# Patient Record
Sex: FEMALE | Race: WHITE | NOT HISPANIC OR LATINO | URBAN - NONMETROPOLITAN AREA
[De-identification: names, ages, dates, MRNs, and addresses within clinical notes are randomized per-mention and may not be internally consistent; named-entity substitution may affect disease eponyms.]

---

## 2017-09-07 ENCOUNTER — OFFICE VISIT (OUTPATIENT)
Dept: MEDICAL GROUP | Facility: CLINIC | Age: 62
End: 2017-09-07
Payer: MEDICAID

## 2017-09-07 VITALS
RESPIRATION RATE: 16 BRPM | BODY MASS INDEX: 33.79 KG/M2 | TEMPERATURE: 98.6 F | OXYGEN SATURATION: 94 % | HEIGHT: 61 IN | WEIGHT: 179 LBS | SYSTOLIC BLOOD PRESSURE: 128 MMHG | HEART RATE: 98 BPM | DIASTOLIC BLOOD PRESSURE: 74 MMHG

## 2017-09-07 DIAGNOSIS — E11.41 TYPE 2 DIABETES MELLITUS WITH DIABETIC MONONEUROPATHY, WITHOUT LONG-TERM CURRENT USE OF INSULIN (HCC): ICD-10-CM

## 2017-09-07 DIAGNOSIS — Z23 NEED FOR VACCINATION: ICD-10-CM

## 2017-09-07 DIAGNOSIS — E78.00 HYPERCHOLESTEREMIA: ICD-10-CM

## 2017-09-07 DIAGNOSIS — E53.8 VITAMIN B12 DEFICIENCY WITHOUT ANEMIA: ICD-10-CM

## 2017-09-07 DIAGNOSIS — M25.561 CHRONIC PAIN OF BOTH KNEES: ICD-10-CM

## 2017-09-07 DIAGNOSIS — Z76.89 ENCOUNTER TO ESTABLISH CARE: ICD-10-CM

## 2017-09-07 DIAGNOSIS — M25.562 CHRONIC PAIN OF BOTH KNEES: ICD-10-CM

## 2017-09-07 DIAGNOSIS — G89.29 CHRONIC PAIN OF BOTH KNEES: ICD-10-CM

## 2017-09-07 DIAGNOSIS — I10 ESSENTIAL HYPERTENSION: ICD-10-CM

## 2017-09-07 PROBLEM — E11.8 TYPE 2 DIABETES MELLITUS WITH COMPLICATION, WITHOUT LONG-TERM CURRENT USE OF INSULIN (HCC): Status: ACTIVE | Noted: 2017-09-07

## 2017-09-07 PROCEDURE — 90686 IIV4 VACC NO PRSV 0.5 ML IM: CPT | Performed by: PHYSICIAN ASSISTANT

## 2017-09-07 PROCEDURE — 99204 OFFICE O/P NEW MOD 45 MIN: CPT | Mod: 25 | Performed by: PHYSICIAN ASSISTANT

## 2017-09-07 PROCEDURE — 90471 IMMUNIZATION ADMIN: CPT | Performed by: PHYSICIAN ASSISTANT

## 2017-09-07 RX ORDER — GABAPENTIN 300 MG/1
300 CAPSULE ORAL DAILY
COMMUNITY
End: 2020-10-22

## 2017-09-07 RX ORDER — HYDROXYZINE 50 MG/1
50 TABLET, FILM COATED ORAL 3 TIMES DAILY PRN
COMMUNITY
End: 2021-05-17

## 2017-09-07 RX ORDER — ATORVASTATIN CALCIUM 20 MG/1
20 TABLET, FILM COATED ORAL NIGHTLY
COMMUNITY
End: 2019-10-14 | Stop reason: SDUPTHER

## 2017-09-07 RX ORDER — ALBUTEROL SULFATE 90 UG/1
2 AEROSOL, METERED RESPIRATORY (INHALATION) EVERY 6 HOURS PRN
COMMUNITY
End: 2019-10-14 | Stop reason: SDUPTHER

## 2017-09-07 ASSESSMENT — PATIENT HEALTH QUESTIONNAIRE - PHQ9: CLINICAL INTERPRETATION OF PHQ2 SCORE: 0

## 2017-09-07 NOTE — ASSESSMENT & PLAN NOTE
Patient has had mild knee pain that is worse in the mornings or after sitting for long periods and better when she stand up and moves around a little bit. She believes it is arthritis and she occasionally takes ibuprofen for it but states that she does not like to take medications just in case they stop working for her. She has no fevers or acute symptoms and some crunching/popping sounds but no trauma to either knee.

## 2017-09-07 NOTE — PROGRESS NOTES
Chief Complaint   Patient presents with   • Establish Care       HISTORY OF THE PRESENT ILLNESS: This is a 62 y.o. female new patient to our practice who presents today for evaluation and management of:    Type 2 diabetes mellitus with diabetic mononeuropathy, without long-term current use of insulin (CMS-[facility])  Last A1c: 7.2 in 2017 per patient   DM Medications: metformin and januvia Patient reports good medication compliance.   HTN: Blood pressure goal <140/<90 Yes  ACE: lisinopril  Hyperlipidemia: Cholesterol goal LDL <100 Unknown, awaiting lab results.   Currently Rx Statin: Atorvastatin   Diabetic diet: Yes, needs more education  Exercise: none currently due to left knee pain.   Last monofilament foot exam:  Checks feet at home: Yes, no sores currently   Last Eye exam: Sees Dr. Cruz in [place] last was 2016   Kidney function: Awaiting lab results  Microalbumin screening: Awaiting results.   Has patient received flu vaccine: Yes, administered today.  Has patient received Hep B series:Yes    A1c goal <7 No  Current barriers to control include education and self control with diet  Glucose monitoring frequency: daily  Fastin-150's Post-Prandial: 160's  Hypoglycemic episodes No  Diabetic complications: peripheral neuropathy    The patient is not taking ASA every day and is taking all other medications as prescribed. Patient denies any side effects of medication.      Chronic pain of both knees  Patient has had mild knee pain that is worse in the mornings or after sitting for long periods and better when she stand up and moves around a little bit. She believes it is arthritis and she occasionally takes ibuprofen for it but states that she does not like to take medications just in case they stop working for her. She has no fevers or acute symptoms and some crunching/popping sounds but no trauma to either knee.     Encounter to establish care  Patient would like to voice grievance with last practitioner  not pushing her hard enough to change her diet or exercise regimen but only pushing increases in medication regardless of diet changes. She also would like to voice that she will walk out of an office if she waits for longer than 40 minutes to be seen.     Essential hypertension  Well controlled on lisinopril 10mg tab at this time. Denies chest pain, shortness of breath, blurred vision, headache or dizziness.     Hypercholesteremia  Patient believes she had labs for this in July and was told she was fine but she is unsure of actual numbers. She takes her atorvastatin 20mg daily although she may occasionally miss a dose. She has no current signs of arteriosclerosis complication.      Vitamin B12 deficiency without anemia  Patient states she had been deemed extremely low in vitaminn B12 but witthout anemia. She had been injecting B12 but recently ran out of this medication.       Past Medical History:   Diagnosis Date   • Asthma     uses Symbicort    • Dental disorder     upper and lower dentures   • Diabetes (CMS-HCC)     on Metformin and Januvia   • High cholesterol    • Hypertension     on Lisinopril   • Psychiatric problem    • Urinary bladder disorder     hx of bladder problems as a child       Past Surgical History:   Procedure Laterality Date   • SHOULDER ARTHROSCOPY W/ ROTATOR CUFF REPAIR Right 3/2/2016    Procedure: SHOULDER ARTHROSCOPY W/ ROTATOR CUFF REPAIR, Subacromial Decompression, Distal Clavicle Resection;  Surgeon: Dm Luke M.D.;  Location: SURGERY [facility];  Service:    • CATARACT EXTRACTION WITH IOL Bilateral    • KNEE ARTHROSCOPY Right    • PB URETEROPLASTY      surgery as a child       No family status information on file.   No family history on file.    Social History   Substance Use Topics   • Smoking status: Current Every Day Smoker     Packs/day: 0.50     Years: 30.00     Types: Cigarettes   • Smokeless tobacco: Never Used      Comment: working to quit on her own    •  Alcohol use Yes      Comment: rare       Allergies: Review of patient's allergies indicates no known allergies.    Current Outpatient Prescriptions Ordered in [facility]   Medication Sig Dispense Refill   • metformin (GLUCOPHAGE) 1000 MG tablet Take 1,000 mg by mouth 2 times a day, with meals.     • atorvastatin (LIPITOR) 20 MG Tab Take 20 mg by mouth every evening.     • gabapentin (NEURONTIN) 300 MG Cap Take 300 mg by mouth 3 times a day.     • hydrOXYzine (ATARAX) 50 MG Tab Take 50 mg by mouth 3 times a day as needed for Itching.     • albuterol (PROVENTIL HFA) 108 (90 Base) MCG/ACT Aero Soln inhalation aerosol Inhale 2 Puffs by mouth every 6 hours as needed for Shortness of Breath.     • lisinopril (PRINIVIL) 10 MG Tab Take 10 mg by mouth every day.     • sitagliptin (JANUVIA) 100 MG Tab Take 100 mg by mouth every bedtime.     • budesonide-formoterol (SYMBICORT) 80-4.5 MCG/ACT Aerosol Inhale 2 Puffs by mouth 2 Times a Day.       No current [facility]-ordered facility-administered medications on file.      Review of Systems:   Constitutional: Negative for fever, chills, unplanned weight change and malaise/fatigue.   HENT: Negative for ear pain or tinnitus, nosebleeds, sore throat and neck pain.    Eyes: Negative for blurred or decreased vision.   Respiratory: Positive for smokers cough, sputum production, shortness of breath upon exertion and negative for wheezing.    Cardiovascular: Negative for chest pain, palpitations, orthopnea, syncope and leg swelling.   Gastrointestinal: Negative for heartburn, nausea, vomiting and abdominal pain.   Genitourinary: Negative for dysuria, urgency and frequency.   Musculoskeletal: See HPI above. Negative for myalgias, back pain.  Skin: Negative for rash, itching, nail changes or skin changes.   Neurological: Negative for dizziness, tremors, sensory change, focal weakness, tingling and headaches. Positive for left greater than right peripheral neuropathy.   Endo/Heme/Allergies: Does not  "bruise/bleed easily.    Psychiatric/Behavioral: Negative for depression, suicidal ideas and memory loss. The patient is not nervous/anxious and does not have insomnia.  Pt does not use recreational drugs or excessive alcohol.       Exam:  Blood pressure 128/74, pulse 98, temperature 37 °C (98.6 °F), resp. rate 16, height 1.549 m (5' 1\"), weight 81.2 kg (179 lb), SpO2 94 %.  General:  Obese female in NAD  Eyes: Conjunctiva clear, lids without ptosis, pupils equal and reactive to light accommodation.  ENMT: Full dentures, doesn't wear tops. No teeth present. Nose and lips without deformity.   Neck: Supple without masses upon palpation. Thyroid is not visibly enlarged.  Pulmonary: Diffuse fine crackles to auscultation. Normal effort. No ronchi, or wheezing upon auscultation.   Cardiovascular: Regular rate and rhythm without murmur. Carotid and radial pulses are intact and equal bilaterally.   Extremities: No clubbing or cyanosis. Bilateral upper and lower extremities without edema. Onychomycosis to bilateral toes  GI: Protuberant abdomen. Soft, nontender, nondistended. Without palpable hepatosplenomegaly.   Neurologic: Positive for peripheral neuropathy worse on left toe compared to right.   Skin: Warm and dry.  Small area of fungal infection on top of right foot.   Musculoskeletal: Normal gait. crepitus in bilateral knees.   Psych: Easily upset, on the verge of tears throughout exam without good reason. Alert and oriented x3.       Medical Decision Making & Discussion:   1. Type 2 diabetes mellitus with diabetic mononeuropathy, without long-term current use of insulin (CMS-[facility])  Continue taking metformin and januvia as prescribed. Retest A1c in 6 weeks after serious diet modification. Patient given many handouts  In regards to diabetic diet modification today from [employer].     2. Essential hypertension  Continue taking lisinoprill . Monitor annually.     3. Hypercholesteremia  Continue to work on " diet modification and take atorvastatin.     4. Vitamin B12 deficiency without anemia  - VITAMIN B12; Future    5. Chronic pain of both knees  - REFERRAL TO ORTHOPEDICS  - DX-KNEE 2- RIGHT; Future  - DX-KNEE 2- LEFT; Future    6. Encounter to establish care  I am happy to follow this patient.     7. Need for vaccination  - Flu Quad Inj >3 Year Pre-Filled PF        Please note that this dictation was created using voice recognition software. I have made every reasonable attempt to correct obvious errors, but I expect that there are errors of grammar and possibly content that I did not discover before finalizing the note.    Return in about 6 weeks (around 10/19/2017) for DM, HTN, HLD.  I have reviewed and agree with history, assessment and plan for the office encounter with this mid level provider  No face to face encounter  Suggested changes or f/u : none other than listed  Signed by Izaiah Mclaughlin M.D., MSc, physician supervisor for this midlevel

## 2017-09-07 NOTE — ASSESSMENT & PLAN NOTE
Patient would like to voice grievance with last practitioner not pushing her hard enough to change her diet or exercise regimen but only pushing increases in medication regardless of diet changes. She also would like to voice that she will walk out of an office if she waits for longer than 40 minutes to be seen.

## 2017-09-07 NOTE — ASSESSMENT & PLAN NOTE
Patient states she had been deemed extremely low in vitaminn B12 but witthout anemia. She had been injecting B12 but recently ran out of this medication.

## 2017-09-07 NOTE — ASSESSMENT & PLAN NOTE
Patient believes she had labs for this in July and was told she was fine but she is unsure of actual numbers. She takes her atorvastatin 20mg daily although she may occasionally miss a dose. She has no current signs of arteriosclerosis complication.

## 2017-09-07 NOTE — ASSESSMENT & PLAN NOTE
Last A1c: 7.2 in 2017 per patient   DM Medications: metformin and januvia Patient reports good medication compliance.   HTN: Blood pressure goal <140/<90 Yes  ACE: lisinopril  Hyperlipidemia: Cholesterol goal LDL <100 Unknown, awaiting lab results.   Currently Rx Statin: Atorvastatin   Diabetic diet: Yes, needs more education  Exercise: none currently due to left knee pain.   Last monofilament foot exam:  Checks feet at home: Yes, no sores currently   Last Eye exam: Sees Dr. Cruz in [place] last was 2016   Kidney function: Awaiting lab results  Microalbumin screening: Awaiting results.   Has patient received flu vaccine: Yes, administered today.  Has patient received Hep B series:Yes    A1c goal <7 No  Current barriers to control include education and self control with diet  Glucose monitoring frequency: daily  Fastin-150's Post-Prandial: 160's  Hypoglycemic episodes No  Diabetic complications: peripheral neuropathy    The patient is not taking ASA every day and is taking all other medications as prescribed. Patient denies any side effects of medication.

## 2017-09-07 NOTE — ASSESSMENT & PLAN NOTE
Well controlled on lisinopril 10mg tab at this time. Denies chest pain, shortness of breath, blurred vision, headache or dizziness.

## 2018-04-23 ENCOUNTER — NON-PROVIDER VISIT (OUTPATIENT)
Dept: MEDICAL GROUP | Facility: CLINIC | Age: 63
End: 2018-04-23
Payer: MEDICAID

## 2018-04-23 DIAGNOSIS — Z11.1 PPD SCREENING TEST: ICD-10-CM

## 2020-08-31 PROBLEM — K83.9 DISORDER OF BILIARY TRACT: Status: ACTIVE | Noted: 2020-08-31

## 2020-10-15 PROBLEM — C50.919 BREAST CANCER IN FEMALE (HCC): Status: ACTIVE | Noted: 2019-12-01

## 2021-01-14 PROBLEM — Z17.1 MALIGNANT NEOPLASM OF OVERLAPPING SITES OF RIGHT BREAST IN FEMALE, ESTROGEN RECEPTOR NEGATIVE (HCC): Status: ACTIVE | Noted: 2020-11-02

## 2021-01-14 PROBLEM — C50.811 MALIGNANT NEOPLASM OF OVERLAPPING SITES OF RIGHT BREAST IN FEMALE, ESTROGEN RECEPTOR NEGATIVE (HCC): Status: ACTIVE | Noted: 2020-11-02

## 2021-02-16 PROBLEM — C77.3: Status: ACTIVE | Noted: 2021-02-16

## 2021-11-11 PROBLEM — C50.411 MALIGNANT NEOPLASM OF UPPER-OUTER QUADRANT OF RIGHT BREAST IN FEMALE, ESTROGEN RECEPTOR NEGATIVE (HCC): Status: ACTIVE | Noted: 2021-11-11

## 2021-11-11 PROBLEM — C78.01 MALIGNANT NEOPLASM METASTATIC TO BOTH LUNGS (HCC): Status: ACTIVE | Noted: 2021-11-11

## 2021-11-11 PROBLEM — Z17.1 MALIGNANT NEOPLASM OF UPPER-OUTER QUADRANT OF RIGHT BREAST IN FEMALE, ESTROGEN RECEPTOR NEGATIVE (HCC): Status: ACTIVE | Noted: 2021-11-11

## 2021-11-11 PROBLEM — C78.02 MALIGNANT NEOPLASM METASTATIC TO BOTH LUNGS (HCC): Status: ACTIVE | Noted: 2021-11-11

## 2021-11-11 PROBLEM — C79.51 METASTASIS TO BONE (HCC): Status: ACTIVE | Noted: 2021-11-11

## 2022-03-09 PROBLEM — T45.1X5A ANEMIA ASSOCIATED WITH CHEMOTHERAPY: Status: ACTIVE | Noted: 2022-03-09

## 2022-03-09 PROBLEM — D64.81 ANEMIA ASSOCIATED WITH CHEMOTHERAPY: Status: ACTIVE | Noted: 2022-03-09
